# Patient Record
Sex: MALE | Race: BLACK OR AFRICAN AMERICAN | NOT HISPANIC OR LATINO | URBAN - METROPOLITAN AREA
[De-identification: names, ages, dates, MRNs, and addresses within clinical notes are randomized per-mention and may not be internally consistent; named-entity substitution may affect disease eponyms.]

---

## 2017-02-11 ENCOUNTER — EMERGENCY (EMERGENCY)
Facility: HOSPITAL | Age: 37
LOS: 1 days | Discharge: HOME | End: 2017-02-11
Attending: EMERGENCY MEDICINE | Admitting: EMERGENCY MEDICINE
Payer: MEDICAID

## 2017-02-11 VITALS
HEART RATE: 88 BPM | SYSTOLIC BLOOD PRESSURE: 138 MMHG | DIASTOLIC BLOOD PRESSURE: 89 MMHG | TEMPERATURE: 98 F | RESPIRATION RATE: 18 BRPM | OXYGEN SATURATION: 100 %

## 2017-02-11 PROCEDURE — 73030 X-RAY EXAM OF SHOULDER: CPT | Mod: 26,LT

## 2017-02-11 PROCEDURE — 99283 EMERGENCY DEPT VISIT LOW MDM: CPT

## 2017-02-11 RX ORDER — IBUPROFEN 200 MG
400 TABLET ORAL ONCE
Qty: 0 | Refills: 0 | Status: DISCONTINUED | OUTPATIENT
Start: 2017-02-11 | End: 2017-02-15

## 2017-02-11 NOTE — ED PROVIDER NOTE - OBJECTIVE STATEMENT
37 y/o M pt with no significant PMHx c/o left shoulder pain s/p falling and landing on it while snowboarding today. Notes pain is worse with movement. Pt felt immediate pain for seconds but then it resolved and he states he heard pop. Pain returned after bending and reaching for something. During the time, pt was told he might have dislocation and self reduced it. Pt took 400mg ibuprofen with relief. Denies weakness, sensory deficits, other injuries or any other complaints. 37 y/o M pt with no significant PMHx c/o left shoulder pain s/p falling and landing on it while snowboarding today. Notes pain is worse with movement. Pt felt immediate pain for seconds but then it resolved and he states he heard pop. Pain returned after bending and reaching for something. During the time, pt was told he might have had dislocation that self reduced. Pt took 400mg ibuprofen with relief. Denies weakness, sensory deficits, other injuries or any other complaints.

## 2017-02-11 NOTE — ED PROVIDER NOTE - NS ED MD SCRIBE ATTENDING SCRIBE SECTIONS
REVIEW OF SYSTEMS/DISPOSITION/HISTORY OF PRESENT ILLNESS/VITAL SIGNS( Pullset)/HIV/PAST MEDICAL/SURGICAL/SOCIAL HISTORY/PHYSICAL EXAM

## 2017-02-11 NOTE — ED PROVIDER NOTE - CARE PLAN
Principal Discharge DX:	Acute pain of right shoulder  Instructions for follow-up, activity and diet:	Call to schedule an appointment in [facility] Medicine Clinic. This clinic is on Tuesday afternoons.  Dr. Yonny Jones and Dr. Michael Louis  [facility] [address] [address] [phone]  Use sling for comfort.  Continue taking your medications as prescribed.  Take ibuprofen (or Motrin) 600mg (3 tablets) up to 4 times per day as needed for pain with food or milk.   Return to this Emergency Department for worsening condition or any other emergency. Principal Discharge DX:	Acute pain of right shoulder  Instructions for follow-up, activity and diet:	Call to schedule an appointment in Sports Medicine Clinic. This clinic is on Tuesday afternoons.  Dr. Yonny Jones and Dr. Michael Louis  [facility] [address] [address] [phone]  Use sling for comfort.  Continue taking your medications as prescribed.  Take ibuprofen (or Motrin) 600mg (3 tablets) up to 4 times per day as needed for pain with food or milk.   Return to this Emergency Department for worsening condition or any other emergency.

## 2017-02-11 NOTE — ED ADULT TRIAGE NOTE - CHIEF COMPLAINT QUOTE
Pt c/o L shoulder pain. Was snowboarding and felt a "crunch." As per pt, was seen by medical staff at [facility] and told pt "probably dislocated shoulder and popped back in." Pt continues to have pain, limited ROM. + sensation, able to move fingers. + radial pulse. Took advil at home with some relief. Arrives with sling in place.

## 2017-02-11 NOTE — ED PROVIDER NOTE - MEDICAL DECISION MAKING DETAILS
37 y/o M pt presents to the ED with left shoulder pain. Potentially fx vs. rotator cuff injury or other soft tissue injury. low jean claude dislocation given normal position compared to right side. Will check shoulder XR. 35 y/o M pt presents to the ED with left shoulder pain. Potentially fx vs. rotator cuff injury or other soft tissue injury. low suspicion for dislocation given normal position compared to right side. Will check shoulder XR.

## 2017-02-11 NOTE — ED PROVIDER NOTE - ATTENDING CONTRIBUTION TO CARE
Lara: 36 yom with fall today while snowboarding on to left shoulder.  Pain worsened after.  On exam, +tn to superior shoulder with some edema.  no ecchymosis, able to range to 90 deg with pain, no tn over AC, elbow and wrist from, distal sensation and cap refill intact.  xrays shows no fracture, likely sprain.  sling.  possible that it was dislocated and reduced now.  no change in plan.

## 2017-02-11 NOTE — ED PROVIDER NOTE - PROGRESS NOTE DETAILS
Chris Reyna MD PGY3: The scribe's documentation has been prepared under my direction and personally reviewed by me in its entirety. I confirm that the note above accurately reflects all work, treatment, procedures, and medical decision making performed by me. Chrsi Reyna MD PGY3: Imaging reviewed. Symptoms improved with symptom targeted therapy. Will discharge w/ instructions to f/u w/ sports clinic.

## 2017-02-11 NOTE — ED PROVIDER NOTE - PLAN OF CARE
Call to schedule an appointment in Sports Medicine Clinic. This clinic is on Tuesday afternoons.  Dr. Yonny Jones and Dr. Michael Louis  [address] [address]   [phone]  Use sling for comfort.  Continue taking your medications as prescribed.  Take ibuprofen (or Motrin) 600mg (3 tablets) up to 4 times per day as needed for pain with food or milk.   Return to this Emergency Department for worsening condition or any other emergency.

## 2017-02-11 NOTE — ED PROVIDER NOTE - UPPER EXTREMITY EXAM, LEFT
no deformity or stepoff, no point tenderness with palpation, tender with passive ROM, ROM limited secondary to tenderness

## 2020-04-26 ENCOUNTER — MESSAGE (OUTPATIENT)
Age: 40
End: 2020-04-26

## 2021-02-02 ENCOUNTER — EMERGENCY (EMERGENCY)
Facility: HOSPITAL | Age: 41
LOS: 1 days | Discharge: HOME | End: 2021-02-02
Attending: EMERGENCY MEDICINE | Admitting: EMERGENCY MEDICINE
Payer: COMMERCIAL

## 2021-02-02 VITALS
SYSTOLIC BLOOD PRESSURE: 133 MMHG | DIASTOLIC BLOOD PRESSURE: 84 MMHG | TEMPERATURE: 98 F | RESPIRATION RATE: 18 BRPM | HEART RATE: 88 BPM | OXYGEN SATURATION: 98 %

## 2021-02-02 DIAGNOSIS — Z20.822 CONTACT WITH AND (SUSPECTED) EXPOSURE TO COVID-19: ICD-10-CM

## 2021-02-02 LAB — SARS-COV-2 RNA SPEC QL NAA+PROBE: SIGNIFICANT CHANGE UP

## 2021-02-02 PROCEDURE — 99283 EMERGENCY DEPT VISIT LOW MDM: CPT

## 2021-02-02 PROCEDURE — U0005: CPT

## 2021-02-02 PROCEDURE — U0003: CPT

## 2021-02-02 NOTE — ED PROVIDER NOTE - PATIENT PORTAL LINK FT
You can access the FollowMyHealth Patient Portal offered by [facility] by registering at the following website: [email or web address]. By joining [employer]’s FollowMyHealth portal, you will also be able to view your health information using other applications (apps) compatible with our system.

## 2021-02-02 NOTE — ED PROVIDER NOTE - NSFOLLOWUPINSTRUCTIONS_ED_ALL_ED_FT
COVID-19 (Coronavirus Disease 2019)    WHAT YOU NEED TO KNOW:    COVID-19 is the disease caused by a coronavirus first discovered in 2019. Coronaviruses generally cause upper respiratory (nose, throat, and lung) infections, such as a cold. The new virus can also cause serious lower respiratory conditions, such as pneumonia or acute respiratory distress syndrome (ARDS). The new virus can also affect many other organs, including the brain and heart. Blood vessels can also be affected, leading to blood clots. Anyone can develop serious problems from the new virus, but your risk is higher if you are 65 or older. A weak immune system, diabetes, or a heart or lung condition can also increase your risk. Your risk is also higher if you are a current or former cigarette smoker.    DISCHARGE INSTRUCTIONS:    If you think you or someone you know may be infected: Do the following to protect others:   •If emergency care is needed, tell the  about the possible infection, or call ahead and tell the emergency department.      •Call a healthcare provider for instructions if symptoms are mild. Anyone who may be infected should not arrive without calling first. The provider will need to protect staff members and other patients.      •The person who may be infected needs to wear a face covering while getting medical care. This will help lower the risk of infecting others. Coverings are not used for anyone who is younger than 2 years, has breathing problems, or cannot remove it. The provider can give you instructions for anyone who cannot wear a covering.      Call your local emergency number (911 in the [place]) or an emergency department if:   •You have trouble breathing or shortness of breath at rest.      •You have chest pain or pressure that lasts longer than 5 minutes.      •You become confused or hard to wake.      •Your lips or face are blue.      •You have a fever of 104°F (40°C) or higher.      Call your doctor if:   •You do not have symptoms of COVID-19 but had close physical contact within 14 days with someone who tested positive.      •You have questions or concerns about your condition or care.      Medicines: You may need any of the following for mild symptoms:   •Decongestants help reduce nasal congestion and help you breathe more easily. If you take decongestant pills, they may make you feel restless or cause problems with your sleep. Do not use decongestant sprays for more than a few days.      •Cough suppressants help reduce coughing. Ask your healthcare provider which type of cough medicine is best for you.      •Acetaminophen decreases pain and fever. It is available without a doctor's order. Ask how much to take and how often to take it. Follow directions. Read the labels of all other medicines you are using to see if they also contain acetaminophen, or ask your doctor or pharmacist. Acetaminophen can cause liver damage if not taken correctly. Do not use more than 4 grams (4,000 milligrams) total of acetaminophen in one day.       •NSAIDs, such as ibuprofen, help decrease swelling, pain, and fever. NSAIDs can cause stomach bleeding or kidney problems in certain people. If you take blood thinner medicine, always ask your healthcare provider if NSAIDs are safe for you. Always read the medicine label and follow directions.      •Take your medicine as directed. Contact your healthcare provider if you think your medicine is not helping or if you have side effects. Tell him or her if you are allergic to any medicine. Keep a list of the medicines, vitamins, and herbs you take. Include the amounts, and when and why you take them. Bring the list or the pill bottles to follow-up visits. Carry your medicine list with you in case of an emergency.      How the 2019 coronavirus spreads: The virus spreads quickly and easily. The virus can be passed starting 2 days before symptoms begin or before a positive test if symptoms never begin. The following are ways the virus is thought to spread, but more information may be coming:   •Droplets are the main way all coronaviruses spread. The virus travels in droplets that form when a person talks, coughs, or sneezes. The droplets can also float in the air for minutes or hours. Infection happens when you breathe in the droplets or get them in your eyes or nose. Close personal contact with an infected person increases your risk for infection. This means being within 6 feet (2 meters) of the person for at least 15 minutes over 24 hours.      •Person-to-person contact can spread the virus. For example, a person with the virus on his or her hands can spread it by shaking hands with someone.      •The virus can stay on objects and surfaces for a short time. You may become infected by touching the object or surface and then touching your eyes or mouth.      •An infected animal may be able to infect a person who touches it. This may happen at live markets or on a farm.      Help lower the risk for COVID-19: The best way to prevent infection is to avoid anyone who is infected, but this can be hard to do. An infected person can spread the virus before signs or symptoms begin, or even if signs or symptoms never develop. The following can help lower the risk for infection:     Prevent COVID-19 Infection     •Wash your hands often throughout the day. Use soap and water. Rub your soapy hands together, lacing your fingers, for at least 20 seconds. Rinse with warm, running water. Dry your hands with a clean towel or paper towel. Use hand  that contains alcohol if soap and water are not available. Teach children how to wash their hands and use hand .  Handwashing           •Cover sneezes and coughs. Turn your face away and cover your mouth and nose with a tissue. Throw the tissue away. Use the bend of your arm if a tissue is not available. Then wash your hands well with soap and water or use hand . Teach children how to cover a cough or sneeze.      •Wear a face covering (mask) around anyone who does not live in your home. Use a disposable non-medical mask, or make a cloth covering with at least 2 layers. Cover your mouth and your nose. Securely fasten it under your chin and on the sides of your face. Do not wear a plastic face shield instead of a covering. Continue social distancing and washing your hands often. A face covering is not a substitute for social distancing safety measures.  How to Wear a Face Covering (Mask)           •Follow worldwide, national, and local social distancing guidelines. Keep at least 6 feet (2 meters) between you and others. Also keep this distance from anyone who comes to your home, such as someone making a delivery.      •Make a habit of not touching your face. If you get the virus on your hands, you can transfer it to your eyes, nose, or mouth and become infected. You can also transfer it to objects, surfaces, or people. Do not touch your eyes, nose, or mouth without washing your hands first.      •Clean and disinfect high-touch surfaces and objects often. Use disinfecting wipes, or make a solution of 4 teaspoons of bleach in 1 quart (4 cups) of water. Clean and disinfect even if you think no one living in or coming to your home is infected with the virus.      •Ask about vaccines you may need. The COVID-19 vaccine is a shot given to help prevent infection caused by the novel coronavirus. It is given to adults and children 16 years of age or older. Your healthcare provider can give you more information about when the vaccine may be available to you. Get the influenza (flu) vaccine as soon as recommended each year, usually starting in September or October. Get the pneumonia vaccine if recommended.      Follow social distancing guidelines: National and local social distancing rules vary. Rules may change over time as restrictions are lifted. Restrictions may return if an outbreak happens where you live. It is important to know and follow all current social distancing rules in your area. The following are general guidelines:  •Stay home if you are sick or think you may have COVID-19. It is important to stay home if you are waiting for a testing appointment or for test results. Even if you do not have symptoms, you can pass the virus to others.      •Limit trips out of your home. Have food, medicines, and other supplies delivered and left at your door or other area, if possible. Plan trips out of your home so you make the fewest stops possible to limit close personal contact. Keep track of places you go. This will help contact tracers notify others if you become infected.      •Avoid close physical contact with anyone who does not live in your home. Do not shake hands with, hug, or kiss a person as a greeting. If you must use public transportation (such as a bus or subway), try to sit or stand away from others. Only allow necessary people into your home. Wear your face covering, and remind others to wear a face covering. Remind them to wash their hands when they arrive and before they leave. Do not let someone into your home or go to someone's home just to visit. Even if you both do not feel sick, the virus can pass from one of you to the other.      •Avoid in-person gatherings and crowds. Gatherings or crowds of 10 or more individuals can cause the virus to spread. Avoid places such as leong, beaches, sporting events, and tourist attractions. For events such as parties, holiday meals, Hindu services, and conferences, attend virtually (on a computer), if possible.      •Ask your healthcare provider for other ways to have appointments. Some providers offer phone, video, or other types of appointments. You may also be able to get prescriptions for a few months of your medicines at a time.      •Stay safe if you must go out to work. Keep physical distance between you and other workers as much as possible. Follow your employer's rules so everyone stays safe.      If you have COVID-19 and are recovering at home, healthcare providers will give you specific instructions to follow. The following are general guidelines to remind you how to keep others safe until you are well:   •Wash your hands often. Use soap and water as much as possible. Use hand  that contains alcohol if soap and water are not available. Dry your hands with a clean towel or paper towel. Do not share towels with anyone. If you use paper towels, throw them away in a lined trash can kept in your room or area. Use a covered trash can, if possible.      •Do not go out of your home unless it is necessary. Ask someone who is not infected to go out for groceries or supplies, or have them delivered. Do not go to your healthcare provider's office without an appointment.      •Only have close physical contact with a person giving direct care, or a baby or child you must care for. Family members and friends should not visit you. If possible, stay in a separate area or room of your home if you live with others. No one should go into the area or room except to give you care. You can visit with others by phone, video chat, e-mail, or similar systems.      •Wear a face covering while others are near you. This can help prevent droplets from spreading the virus when you talk, sneeze, or cough. Put the covering on before anyone comes into your room or area. Remind the person to cover his or her nose and mouth before coming in to provide care for you.      •Do not share items. Do not share dishes, towels, or other items with anyone. Items need to be washed after you use them.      •Protect your baby. Some newborns have tested positive for the virus. It is not known if they became infected before or after birth. The highest risk is when a  has close contact with an infected person. If you are pregnant or breastfeeding, talk to your healthcare provider or obstetrician about any concerns you have. He or she will tell you when to bring your baby in for check-ups and vaccines. He or she will also tell you what to do if you think your baby was infected with the coronavirus. Wash your hands and put on a clean face covering before you breastfeed or care for your baby.      •Do not handle live animals unless it is necessary. Some animals, including pets, have been infected with the new coronavirus. Ask someone who is not infected to take care of your pet until you are well. If you must care for a pet, wear a face covering. Wash your hands before and after you give care. Talk to your healthcare provider about how to keep a service animal safe, if needed.      •Follow directions from your healthcare provider for being around others after you recover. It is not known if or for how long a recovered person can pass the virus to others. Your provider may give you instructions, such as continuing social distancing and wearing a face covering. He or she will tell you when it is okay to be around others again. This may be 10 to 20 days after symptoms started or you had a positive test. Most symptoms will also need to be gone. Your provider will give you more information.      Follow up with your doctor as directed: Write down your questions so you remember to ask them during your visits.    For more information:   •[employer] Disease Control and Prevention  [address] [place] [address]  Phone: 1-938.881.5172  Web Address: http://www.[employer].gov           © Copyright [employer]            back to top                          © Copyright [employer]

## 2023-02-14 PROBLEM — Z00.00 ENCOUNTER FOR PREVENTIVE HEALTH EXAMINATION: Status: ACTIVE | Noted: 2023-02-14

## 2023-06-08 ENCOUNTER — EMERGENCY (EMERGENCY)
Facility: HOSPITAL | Age: 43
LOS: 1 days | Discharge: HOME | End: 2023-06-08
Attending: EMERGENCY MEDICINE | Admitting: EMERGENCY MEDICINE
Payer: COMMERCIAL

## 2023-06-08 VITALS
DIASTOLIC BLOOD PRESSURE: 83 MMHG | RESPIRATION RATE: 17 BRPM | HEART RATE: 79 BPM | OXYGEN SATURATION: 97 % | SYSTOLIC BLOOD PRESSURE: 143 MMHG | TEMPERATURE: 98 F
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
## 2023-06-08 DIAGNOSIS — S61.232A PUNCTURE WOUND WITHOUT FOREIGN BODY OF RIGHT MIDDLE FINGER WITHOUT DAMAGE TO NAIL, INITIAL ENCOUNTER: ICD-10-CM
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
## 2023-06-08 DIAGNOSIS — Z23 ENCOUNTER FOR IMMUNIZATION: ICD-10-CM
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
## 2023-06-08 LAB
ALBUMIN SERPL ELPH-MCNC: 4.5 G/DL — SIGNIFICANT CHANGE UP (ref 3.3–5)
ALP SERPL-CCNC: 94 U/L — SIGNIFICANT CHANGE UP (ref 40–120)
ALT FLD-CCNC: 23 U/L — SIGNIFICANT CHANGE UP (ref 10–45)
ANION GAP SERPL CALC-SCNC: 13 MMOL/L — SIGNIFICANT CHANGE UP (ref 5–17)
AST SERPL-CCNC: 19 U/L — SIGNIFICANT CHANGE UP (ref 10–40)
BASOPHILS # BLD AUTO: 0.09 K/UL — SIGNIFICANT CHANGE UP (ref 0–0.2)
BASOPHILS NFR BLD AUTO: 1.1 % — SIGNIFICANT CHANGE UP (ref 0–2)
BILIRUB SERPL-MCNC: 0.6 MG/DL — SIGNIFICANT CHANGE UP (ref 0.2–1.2)
BUN SERPL-MCNC: 13 MG/DL — SIGNIFICANT CHANGE UP (ref 7–23)
CALCIUM SERPL-MCNC: 9.8 MG/DL — SIGNIFICANT CHANGE UP (ref 8.4–10.5)
CHLORIDE SERPL-SCNC: 104 MMOL/L — SIGNIFICANT CHANGE UP (ref 96–108)
CO2 SERPL-SCNC: 24 MMOL/L — SIGNIFICANT CHANGE UP (ref 22–31)
CREAT SERPL-MCNC: 1.19 MG/DL — SIGNIFICANT CHANGE UP (ref 0.5–1.3)
EGFR: 77 ML/MIN/1.73M2 — SIGNIFICANT CHANGE UP
EOSINOPHIL # BLD AUTO: 0.18 K/UL — SIGNIFICANT CHANGE UP (ref 0–0.5)
EOSINOPHIL NFR BLD AUTO: 2.3 % — SIGNIFICANT CHANGE UP (ref 0–6)
GLUCOSE SERPL-MCNC: 91 MG/DL — SIGNIFICANT CHANGE UP (ref 70–99)
HBV SURFACE AB SER-ACNC: SIGNIFICANT CHANGE UP
HBV SURFACE AG SER-ACNC: SIGNIFICANT CHANGE UP
HCT VFR BLD CALC: 42.8 % — SIGNIFICANT CHANGE UP (ref 39–50)
HCV AB S/CO SERPL IA: 0.04 S/CO — SIGNIFICANT CHANGE UP
HCV AB SERPL-IMP: SIGNIFICANT CHANGE UP
HGB BLD-MCNC: 14.5 G/DL — SIGNIFICANT CHANGE UP (ref 13–17)
HIV 1+2 AB+HIV1 P24 AG SERPL QL IA: SIGNIFICANT CHANGE UP
IMM GRANULOCYTES NFR BLD AUTO: 0.1 % — SIGNIFICANT CHANGE UP (ref 0–0.9)
LYMPHOCYTES # BLD AUTO: 2.16 K/UL — SIGNIFICANT CHANGE UP (ref 1–3.3)
LYMPHOCYTES # BLD AUTO: 27.3 % — SIGNIFICANT CHANGE UP (ref 13–44)
MCHC RBC-ENTMCNC: 29.7 PG — SIGNIFICANT CHANGE UP (ref 27–34)
MCHC RBC-ENTMCNC: 33.9 GM/DL — SIGNIFICANT CHANGE UP (ref 32–36)
MCV RBC AUTO: 87.5 FL — SIGNIFICANT CHANGE UP (ref 80–100)
MONOCYTES # BLD AUTO: 0.77 K/UL — SIGNIFICANT CHANGE UP (ref 0–0.9)
MONOCYTES NFR BLD AUTO: 9.7 % — SIGNIFICANT CHANGE UP (ref 2–14)
NEUTROPHILS # BLD AUTO: 4.69 K/UL — SIGNIFICANT CHANGE UP (ref 1.8–7.4)
NEUTROPHILS NFR BLD AUTO: 59.5 % — SIGNIFICANT CHANGE UP (ref 43–77)
NRBC # BLD: 0 /100 WBCS — SIGNIFICANT CHANGE UP (ref 0–0)
PLATELET # BLD AUTO: 310 K/UL — SIGNIFICANT CHANGE UP (ref 150–400)
POTASSIUM SERPL-MCNC: 4 MMOL/L — SIGNIFICANT CHANGE UP (ref 3.5–5.3)
POTASSIUM SERPL-SCNC: 4 MMOL/L — SIGNIFICANT CHANGE UP (ref 3.5–5.3)
PROT SERPL-MCNC: 7.4 G/DL — SIGNIFICANT CHANGE UP (ref 6–8.3)
RBC # BLD: 4.89 M/UL — SIGNIFICANT CHANGE UP (ref 4.2–5.8)
RBC # FLD: 12.3 % — SIGNIFICANT CHANGE UP (ref 10.3–14.5)
SODIUM SERPL-SCNC: 141 MMOL/L — SIGNIFICANT CHANGE UP (ref 135–145)
WBC # BLD: 7.9 K/UL — SIGNIFICANT CHANGE UP (ref 3.8–10.5)
WBC # FLD AUTO: 7.9 K/UL — SIGNIFICANT CHANGE UP (ref 3.8–10.5)

## 2023-06-08 PROCEDURE — 90715 TDAP VACCINE 7 YRS/> IM: CPT

## 2023-06-08 PROCEDURE — 86803 HEPATITIS C AB TEST: CPT

## 2023-06-08 PROCEDURE — 85025 COMPLETE CBC W/AUTO DIFF WBC: CPT

## 2023-06-08 PROCEDURE — 80053 COMPREHEN METABOLIC PANEL: CPT

## 2023-06-08 PROCEDURE — 99283 EMERGENCY DEPT VISIT LOW MDM: CPT | Mod: 25

## 2023-06-08 PROCEDURE — 87340 HEPATITIS B SURFACE AG IA: CPT

## 2023-06-08 PROCEDURE — 99284 EMERGENCY DEPT VISIT MOD MDM: CPT

## 2023-06-08 PROCEDURE — 36415 COLL VENOUS BLD VENIPUNCTURE: CPT

## 2023-06-08 PROCEDURE — 90471 IMMUNIZATION ADMIN: CPT

## 2023-06-08 PROCEDURE — 87521 HEPATITIS C PROBE&RVRS TRNSC: CPT

## 2023-06-08 PROCEDURE — 86706 HEP B SURFACE ANTIBODY: CPT

## 2023-06-08 PROCEDURE — 87389 HIV-1 AG W/HIV-1&-2 AB AG IA: CPT

## 2023-06-08 RX ORDER — TETANUS TOXOID, REDUCED DIPHTHERIA TOXOID AND ACELLULAR PERTUSSIS VACCINE, ADSORBED 5; 2.5; 8; 8; 2.5 [IU]/.5ML; [IU]/.5ML; UG/.5ML; UG/.5ML; UG/.5ML
0.5 SUSPENSION INTRAMUSCULAR ONCE
Refills: 0 | Status: COMPLETED | OUTPATIENT
Start: 2023-06-08 | End: 2023-06-08

## 2023-06-08 RX ADMIN — TETANUS TOXOID, REDUCED DIPHTHERIA TOXOID AND ACELLULAR PERTUSSIS VACCINE, ADSORBED 0.5 MILLILITER(S): 5; 2.5; 8; 8; 2.5 SUSPENSION INTRAMUSCULAR at 20:55

## 2023-06-08 NOTE — ED PROVIDER NOTE - PATIENT PORTAL LINK FT
You can access the FollowMyHealth Patient Portal offered by [facility] by registering at the following website: [email or web address]. By joining [employer]’s FollowMyHealth portal, you will also be able to view your health information using other applications (apps) compatible with our system.

## 2023-06-08 NOTE — ED PROVIDER NOTE - NSFOLLOWUPINSTRUCTIONS_ED_ALL_ED_FT
Follow up for HIV testing in 6 weeks and 3 months   Follow up with employee health     Needlestick and Sharps Injury  A needlestick injury happens when a person is stuck with a needle or sharp tool (sharps) that may have someone else's blood on it. Needlestick injuries are most common among health care workers but can also happen to people in the community who are exposed to needles. A needlestick injury may expose a person to blood or body fluids that carry viruses that cause infection, such as:  Hepatitis B virus.  Hepatitis C virus.  HIV (human immunodeficiency virus).  What are the causes?  This injury is caused by a needle or other sharp tool that punctures or cuts your skin. It can happen to people who are:  Giving an injection.  Drawing blood.  Performing medical procedures with a needle or scalpel.  Handling or throwing away used needles.  Cleaning equipment or patient care areas.  This injury can also occur if you:  Accidentally come into contact with a needle that was discarded in a public place.  Share a needle or inject illegal drugs.  What increases the risk?  This injury is more likely to happen to health care workers who:  Recap needles.  Pass sharp objects to another person.  Do not use or have access to needle safety devices.  Feel pressure or a sense of urgency in the workplace when using needles or sharps.  What are the signs or symptoms?  Symptoms of this injury include:  Pain or irritation at the injury site.  Bleeding at the injury site.  How is this diagnosed?  This condition is diagnosed based on:  A physical exam.  How the injury happened.  Your health care provider may check the medical history of the person whose blood you were exposed to. That person's blood may need to be tested.    Tell your health care provider if you are pregnant or breastfeeding.    How is this treated?  A person having a blood sample taken from arm.  If you have a needle stick injury or think you may have been exposed to blood or body fluids:  Wash the injured area right away with soap and water for at least 20 seconds.  Place a bandage (dressing) or clean towel on the wound and apply gentle pressure to stop the bleeding. Do not squeeze or rub the area.  Notify a workplace supervisor or health care provider right away. Then:  Follow any procedures in your workplace, if applicable.  Get treatment right away, if needed. Treatment must be started as soon as possible after the exposure.  Keep your vaccinations up to date, if you are an adult. Adults are at risk for developing different diseases, and protection (immunity) from childhood vaccines can wear off over time.  Treatments may include:  A tetanus booster shot. This shot may be given if you have not had a tetanus booster shot within the past 10 years.  A hepatitis B vaccination.  Blood tests. These may be recommended for up to 6 months after the injury to rule out infection.  Medicines to prevent infection (postexposure prophylaxis).  Wound care.  Follow these instructions at home:  Wound care    There are many ways to close and cover a wound. For example, a wound can be covered with stitches (sutures), skin glue, or adhesive strips. Follow instructions from your health care provider about how to take care of your wound. Make sure you:  Wash your hands with soap and water for at least 20 seconds before and after you change your dressing, if you have one. If soap and water are not available, use hand .  Change your dressing as told by your health care provider.  Leave stitches (sutures), skin glue, or adhesive strips in place. These skin closures may need to stay in place for 2 weeks or longer. If adhesive strip edges start to loosen and curl up, you may trim the loose edges. Do not remove adhesive strips completely unless your health care provider tells you to do that.  Keep the dressing dry as told by your health care provider. Do not take baths, swim, use a hot tub, or do anything that would put your wound underwater until your health care provider approves.  Check your wound every day for signs of infection. Check for:  Redness, swelling, or pain.  Fluid or blood.  Warmth.  Pus or a bad smell.  General instructions    Take over-the-counter and prescription medicines only as told by your health care provider.  If you were prescribed an antibiotic medicine, take it as told by your health care provider. Do not stop using the antibiotic even if you start to feel better.  Keep all follow-up visits. This is important.  Contact a health care provider if:  You have redness, swelling, or more bleeding or pain at the injury site.  You have a fever or tiredness.  You feel anxious, angry, or depressed.  You have trouble sleeping.  You feel generally sick (malaise) or develop infections often.  Your skin or the white parts of your eyes turn yellow (jaundice).  You have pain or a feeling of fullness in your abdomen.  Summary  A needlestick injury happens when a person is stuck with a needle or sharp object that may have someone else's blood on it. The injury may expose the person to blood that carries viruses that cause infection.  Treatment starts with cleaning the injured area right away with soap and water for at least 20 seconds.  Treatment may also include a tetanus booster shot, a hepatitis B vaccine, and medicines to prevent infection.  This information is not intended to replace advice given to you by your health care provider. Make sure you discuss any questions you have with your health care provider.

## 2023-06-08 NOTE — ED ADULT NURSE NOTE - OBJECTIVE STATEMENT
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  pt c/o "bovi" needle stick tonight while cleaning the OR. stick located R middle finger, pt states he bled from stick.  denies pain, or any other c/o.  Pt educated to follow up with [facility] and primary MD for further care.

## 2023-06-08 NOTE — ED PROVIDER NOTE - CLINICAL SUMMARY MEDICAL DECISION MAKING FREE TEXT BOX
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 Pt was stuck by a bloody bovi needle while cleaning the OR. Pts finger bled. Pt is utd with vaccines, unsure about tetanus. spoke with plastic surgeon who did the case, states the source pt was screened for HIV and hep C as part of her pre op tests and is negative for both. spoke with pt about very low risk for exposure. Pt does not want PEP. Will update tetanus

## 2023-06-08 NOTE — ED PROVIDER NOTE - OBJECTIVE STATEMENT
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 Pt was stuck by a bloody bovi needle while cleaning the OR. Pts finger bled. Pt is utd with vaccines, unsure about tetanus. spoke with plastic surgeon who did the case, states the source pt was screened for HIV and hep C as part of her pre op tests and is negative for both.

## 2023-06-08 NOTE — ED PROVIDER NOTE - PHYSICAL EXAMINATION
CONSTITUTIONAL: Well-appearing;  in no apparent distress.   HEAD: Normocephalic; atraumatic.   EYES: PERRL; EOM intact; conjunctiva and sclera clear  SKIN: R middle finger- pinpoint prick to volar aspect of finger

## 2023-06-08 NOTE — ED PROVIDER NOTE - ATTENDING APP SHARED VISIT CONTRIBUTION OF CARE
Vitals wnl, exam as above.   needlestick.   Discussed importance of outpt follow up and return precautions. Clinically no indication for further emergent ED workup or hospitalization at this time. Stable for dc, outpt f/u.

## 2023-06-09 LAB
HCV RNA FLD QL NAA+PROBE: SIGNIFICANT CHANGE UP
HCV RNA SPEC QL PROBE+SIG AMP: SIGNIFICANT CHANGE UP